# Patient Record
Sex: MALE | Race: BLACK OR AFRICAN AMERICAN | NOT HISPANIC OR LATINO | Employment: FULL TIME | URBAN - METROPOLITAN AREA
[De-identification: names, ages, dates, MRNs, and addresses within clinical notes are randomized per-mention and may not be internally consistent; named-entity substitution may affect disease eponyms.]

---

## 2023-12-29 ENCOUNTER — HOSPITAL ENCOUNTER (EMERGENCY)
Facility: HOSPITAL | Age: 43
Discharge: HOME | End: 2023-12-29
Attending: EMERGENCY MEDICINE
Payer: COMMERCIAL

## 2023-12-29 ENCOUNTER — APPOINTMENT (EMERGENCY)
Dept: CT IMAGING | Facility: HOSPITAL | Age: 43
End: 2023-12-29
Payer: COMMERCIAL

## 2023-12-29 VITALS
WEIGHT: 314.82 LBS | HEIGHT: 74 IN | DIASTOLIC BLOOD PRESSURE: 89 MMHG | OXYGEN SATURATION: 97 % | RESPIRATION RATE: 20 BRPM | HEART RATE: 104 BPM | TEMPERATURE: 98 F | BODY MASS INDEX: 40.4 KG/M2 | SYSTOLIC BLOOD PRESSURE: 163 MMHG

## 2023-12-29 DIAGNOSIS — N23 RENAL COLIC ON RIGHT SIDE: Primary | ICD-10-CM

## 2023-12-29 DIAGNOSIS — R11.2 NAUSEA AND VOMITING: ICD-10-CM

## 2023-12-29 LAB
ALBUMIN SERPL BCP-MCNC: 4.2 G/DL (ref 3.5–5)
ALP SERPL-CCNC: 57 U/L (ref 34–104)
ALT SERPL W P-5'-P-CCNC: 25 U/L (ref 7–52)
ANION GAP SERPL CALCULATED.3IONS-SCNC: 9 MMOL/L
AST SERPL W P-5'-P-CCNC: 19 U/L (ref 13–39)
BACTERIA UR QL AUTO: NORMAL /HPF
BASOPHILS # BLD AUTO: 0.05 THOUSANDS/ÂΜL (ref 0–0.1)
BASOPHILS NFR BLD AUTO: 1 % (ref 0–1)
BILIRUB SERPL-MCNC: 0.76 MG/DL (ref 0.2–1)
BILIRUB UR QL STRIP: NEGATIVE
BUN SERPL-MCNC: 15 MG/DL (ref 5–25)
CALCIUM SERPL-MCNC: 9 MG/DL (ref 8.4–10.2)
CHLORIDE SERPL-SCNC: 102 MMOL/L (ref 96–108)
CK SERPL-CCNC: 248 U/L (ref 39–308)
CLARITY UR: CLEAR
CO2 SERPL-SCNC: 25 MMOL/L (ref 21–32)
COLOR UR: COLORLESS
CREAT SERPL-MCNC: 0.96 MG/DL (ref 0.6–1.3)
EOSINOPHIL # BLD AUTO: 0.07 THOUSAND/ÂΜL (ref 0–0.61)
EOSINOPHIL NFR BLD AUTO: 1 % (ref 0–6)
ERYTHROCYTE [DISTWIDTH] IN BLOOD BY AUTOMATED COUNT: 12.9 % (ref 11.6–15.1)
GFR SERPL CREATININE-BSD FRML MDRD: 95 ML/MIN/1.73SQ M
GLUCOSE SERPL-MCNC: 359 MG/DL (ref 65–140)
GLUCOSE UR STRIP-MCNC: ABNORMAL MG/DL
HCT VFR BLD AUTO: 42.1 % (ref 36.5–49.3)
HGB BLD-MCNC: 14 G/DL (ref 12–17)
HGB UR QL STRIP.AUTO: ABNORMAL
IMM GRANULOCYTES # BLD AUTO: 0.04 THOUSAND/UL (ref 0–0.2)
IMM GRANULOCYTES NFR BLD AUTO: 0 % (ref 0–2)
KETONES UR STRIP-MCNC: NEGATIVE MG/DL
LEUKOCYTE ESTERASE UR QL STRIP: ABNORMAL
LIPASE SERPL-CCNC: 59 U/L (ref 11–82)
LYMPHOCYTES # BLD AUTO: 5 THOUSANDS/ÂΜL (ref 0.6–4.47)
LYMPHOCYTES NFR BLD AUTO: 51 % (ref 14–44)
MCH RBC QN AUTO: 23.6 PG (ref 26.8–34.3)
MCHC RBC AUTO-ENTMCNC: 33.3 G/DL (ref 31.4–37.4)
MCV RBC AUTO: 71 FL (ref 82–98)
MONOCYTES # BLD AUTO: 0.94 THOUSAND/ÂΜL (ref 0.17–1.22)
MONOCYTES NFR BLD AUTO: 10 % (ref 4–12)
NEUTROPHILS # BLD AUTO: 3.6 THOUSANDS/ÂΜL (ref 1.85–7.62)
NEUTS SEG NFR BLD AUTO: 37 % (ref 43–75)
NITRITE UR QL STRIP: NEGATIVE
NON-SQ EPI CELLS URNS QL MICRO: NORMAL /HPF
NRBC BLD AUTO-RTO: 0 /100 WBCS
PH UR STRIP.AUTO: 5.5 [PH]
PLATELET # BLD AUTO: 324 THOUSANDS/UL (ref 149–390)
PMV BLD AUTO: 10.6 FL (ref 8.9–12.7)
POTASSIUM SERPL-SCNC: 4.4 MMOL/L (ref 3.5–5.3)
PROT SERPL-MCNC: 7.3 G/DL (ref 6.4–8.4)
PROT UR STRIP-MCNC: ABNORMAL MG/DL
RBC # BLD AUTO: 5.92 MILLION/UL (ref 3.88–5.62)
RBC #/AREA URNS AUTO: NORMAL /HPF
SODIUM SERPL-SCNC: 136 MMOL/L (ref 135–147)
SP GR UR STRIP.AUTO: 1.03 (ref 1–1.03)
UROBILINOGEN UR STRIP-ACNC: <2 MG/DL
WBC # BLD AUTO: 9.7 THOUSAND/UL (ref 4.31–10.16)
WBC #/AREA URNS AUTO: NORMAL /HPF

## 2023-12-29 PROCEDURE — 96374 THER/PROPH/DIAG INJ IV PUSH: CPT

## 2023-12-29 PROCEDURE — 36415 COLL VENOUS BLD VENIPUNCTURE: CPT

## 2023-12-29 PROCEDURE — 96361 HYDRATE IV INFUSION ADD-ON: CPT

## 2023-12-29 PROCEDURE — 85025 COMPLETE CBC W/AUTO DIFF WBC: CPT | Performed by: EMERGENCY MEDICINE

## 2023-12-29 PROCEDURE — 80053 COMPREHEN METABOLIC PANEL: CPT | Performed by: EMERGENCY MEDICINE

## 2023-12-29 PROCEDURE — 99285 EMERGENCY DEPT VISIT HI MDM: CPT | Performed by: EMERGENCY MEDICINE

## 2023-12-29 PROCEDURE — 82550 ASSAY OF CK (CPK): CPT | Performed by: EMERGENCY MEDICINE

## 2023-12-29 PROCEDURE — 74176 CT ABD & PELVIS W/O CONTRAST: CPT

## 2023-12-29 PROCEDURE — 99284 EMERGENCY DEPT VISIT MOD MDM: CPT

## 2023-12-29 PROCEDURE — 83690 ASSAY OF LIPASE: CPT | Performed by: EMERGENCY MEDICINE

## 2023-12-29 PROCEDURE — G1004 CDSM NDSC: HCPCS

## 2023-12-29 PROCEDURE — 81001 URINALYSIS AUTO W/SCOPE: CPT | Performed by: EMERGENCY MEDICINE

## 2023-12-29 PROCEDURE — 96375 TX/PRO/DX INJ NEW DRUG ADDON: CPT

## 2023-12-29 RX ORDER — ONDANSETRON 4 MG/1
4 TABLET, ORALLY DISINTEGRATING ORAL EVERY 6 HOURS PRN
Qty: 20 TABLET | Refills: 0 | Status: SHIPPED | OUTPATIENT
Start: 2023-12-29

## 2023-12-29 RX ORDER — SODIUM CHLORIDE 9 MG/ML
150 INJECTION, SOLUTION INTRAVENOUS CONTINUOUS
Status: DISCONTINUED | OUTPATIENT
Start: 2023-12-29 | End: 2023-12-29 | Stop reason: HOSPADM

## 2023-12-29 RX ORDER — OXYCODONE HYDROCHLORIDE AND ACETAMINOPHEN 5; 325 MG/1; MG/1
1 TABLET ORAL ONCE
Status: COMPLETED | OUTPATIENT
Start: 2023-12-29 | End: 2023-12-29

## 2023-12-29 RX ORDER — ONDANSETRON 2 MG/ML
4 INJECTION INTRAMUSCULAR; INTRAVENOUS ONCE
Status: COMPLETED | OUTPATIENT
Start: 2023-12-29 | End: 2023-12-29

## 2023-12-29 RX ORDER — HYDROMORPHONE HCL/PF 1 MG/ML
1 SYRINGE (ML) INJECTION ONCE
Status: COMPLETED | OUTPATIENT
Start: 2023-12-29 | End: 2023-12-29

## 2023-12-29 RX ORDER — OXYCODONE HYDROCHLORIDE AND ACETAMINOPHEN 5; 325 MG/1; MG/1
1 TABLET ORAL EVERY 4 HOURS PRN
Qty: 20 TABLET | Refills: 0 | Status: SHIPPED | OUTPATIENT
Start: 2023-12-29 | End: 2024-01-02

## 2023-12-29 RX ORDER — KETOROLAC TROMETHAMINE 30 MG/ML
30 INJECTION, SOLUTION INTRAMUSCULAR; INTRAVENOUS ONCE
Status: COMPLETED | OUTPATIENT
Start: 2023-12-29 | End: 2023-12-29

## 2023-12-29 RX ADMIN — HYDROMORPHONE HYDROCHLORIDE 1 MG: 1 INJECTION, SOLUTION INTRAMUSCULAR; INTRAVENOUS; SUBCUTANEOUS at 03:42

## 2023-12-29 RX ADMIN — SODIUM CHLORIDE 1000 ML: 0.9 INJECTION, SOLUTION INTRAVENOUS at 02:58

## 2023-12-29 RX ADMIN — ONDANSETRON 4 MG: 2 INJECTION INTRAMUSCULAR; INTRAVENOUS at 02:58

## 2023-12-29 RX ADMIN — OXYCODONE HYDROCHLORIDE AND ACETAMINOPHEN 1 TABLET: 5; 325 TABLET ORAL at 06:31

## 2023-12-29 RX ADMIN — SODIUM CHLORIDE 150 ML/HR: 0.9 INJECTION, SOLUTION INTRAVENOUS at 03:44

## 2023-12-29 RX ADMIN — KETOROLAC TROMETHAMINE 30 MG: 30 INJECTION, SOLUTION INTRAMUSCULAR; INTRAVENOUS at 02:58

## 2023-12-29 NOTE — ED PROVIDER NOTES
History  Chief Complaint   Patient presents with    Flank Pain     Pt reports R flank pain that started tonight, +NV, very uncomfortable in triage, hx diabetes, no difficulty urinating      Patient is a 43 year old male with right flank pain without radiation with N/V tonight. No prior episodes. No fever. No diarrhea. No urinary sx.  Has diabetes. No recent old records from this ED seen on computer system.  [employer] website checked on this patient and no Rx found.       History provided by:  Patient and spouse   used: No    Flank Pain  Associated symptoms: nausea and vomiting    Associated symptoms: no diarrhea and no fever        None       Past Medical History:   Diagnosis Date    Diabetes mellitus (HCC)        History reviewed. No pertinent surgical history.    History reviewed. No pertinent family history.  I have reviewed and agree with the history as documented.    E-Cigarette/Vaping     E-Cigarette/Vaping Substances     Social History     Tobacco Use    Smoking status: Never    Smokeless tobacco: Never   Substance Use Topics    Alcohol use: Yes    Drug use: Never       Review of Systems   Constitutional:  Negative for fever.   Gastrointestinal:  Positive for nausea and vomiting. Negative for diarrhea.   Genitourinary:  Positive for flank pain. Negative for difficulty urinating.   All other systems reviewed and are negative.      Physical Exam  Physical Exam  Vitals and nursing note reviewed.   Constitutional:       General: He is in acute distress (moderate).      Appearance: He is diaphoretic.   HENT:      Head: Normocephalic and atraumatic.      Mouth/Throat:      Mouth: Mucous membranes are moist.   Eyes:      General: No scleral icterus.  Cardiovascular:      Rate and Rhythm: Regular rhythm. Tachycardia present.      Heart sounds: Normal heart sounds. No murmur heard.  Pulmonary:      Effort: Pulmonary effort is normal. No respiratory distress.      Breath sounds: Normal breath  sounds.   Abdominal:      General: Bowel sounds are normal. There is no distension.      Palpations: Abdomen is soft.      Tenderness: There is no abdominal tenderness. There is right CVA tenderness. There is no guarding.   Musculoskeletal:         General: No deformity.      Cervical back: Normal range of motion and neck supple.      Right lower leg: No edema.      Left lower leg: No edema.   Skin:     General: Skin is warm.      Findings: No erythema or rash.   Neurological:      General: No focal deficit present.      Mental Status: He is alert and oriented to person, place, and time.   Psychiatric:         Mood and Affect: Mood normal.         Vital Signs  ED Triage Vitals [12/29/23 0221]   Temperature Pulse Respirations Blood Pressure SpO2   98 °F (36.7 °C) 100 20 163/89 97 %      Temp Source Heart Rate Source Patient Position - Orthostatic VS BP Location FiO2 (%)   Oral Monitor Sitting Left arm --      Pain Score       10 - Worst Possible Pain           Vitals:    12/29/23 0221 12/29/23 0400 12/29/23 0430 12/29/23 0615   BP: 163/89      Pulse: 100 103 104 104   Patient Position - Orthostatic VS: Sitting            Visual Acuity      ED Medications  Medications   sodium chloride 0.9 % infusion (0 mL/hr Intravenous Stopped 12/29/23 0629)   oxyCODONE-acetaminophen (PERCOCET) 5-325 mg per tablet 1 tablet (has no administration in time range)   sodium chloride 0.9 % bolus 1,000 mL (0 mL Intravenous Stopped 12/29/23 0358)   ondansetron (ZOFRAN) injection 4 mg (4 mg Intravenous Given 12/29/23 0258)   ketorolac (TORADOL) injection 30 mg (30 mg Intravenous Given 12/29/23 0258)   HYDROmorphone (DILAUDID) injection 1 mg (1 mg Intravenous Given 12/29/23 0342)       Diagnostic Studies  Results Reviewed       Procedure Component Value Units Date/Time    Urine Microscopic [[id number]]  (Normal) Collected: 12/29/23 0429    Lab Status: Final result Specimen: Urine, Clean Catch Updated: 12/29/23 0518     RBC, UA 1-2 /hpf       WBC, UA None Seen /hpf      Epithelial Cells None Seen /hpf      Bacteria, UA None Seen /hpf     UA w Reflex to Microscopic w Reflex to Culture [[id number]]  (Abnormal) Collected: 12/29/23 0429    Lab Status: Final result Specimen: Urine, Clean Catch Updated: 12/29/23 0516     Color, UA Colorless     Clarity, UA Clear     Specific Gravity, UA 1.033     pH, UA 5.5     Leukocytes, UA Elevated glucose may cause decreased leukocyte values. See urine microscopic for UWBC result     Nitrite, UA Negative     Protein, UA 30 (1+) mg/dl      Glucose, UA >=1000 (1%) mg/dl      Ketones, UA Negative mg/dl      Urobilinogen, UA <2.0 mg/dl      Bilirubin, UA Negative     Occult Blood, UA Trace    CK [[id number]]  (Normal) Collected: 12/29/23 0229    Lab Status: Final result Specimen: Blood from Arm, Left Updated: 12/29/23 0307     Total  U/L     Comprehensive metabolic panel [[id number]]  (Abnormal) Collected: 12/29/23 0229    Lab Status: Final result Specimen: Blood from Arm, Left Updated: 12/29/23 0254     Sodium 136 mmol/L      Potassium 4.4 mmol/L      Chloride 102 mmol/L      CO2 25 mmol/L      ANION GAP 9 mmol/L      BUN 15 mg/dL      Creatinine 0.96 mg/dL      Glucose 359 mg/dL      Calcium 9.0 mg/dL      AST 19 U/L      ALT 25 U/L      Alkaline Phosphatase 57 U/L      Total Protein 7.3 g/dL      Albumin 4.2 g/dL      Total Bilirubin 0.76 mg/dL      eGFR 95 ml/min/1.73sq m     Narrative:      National Kidney Disease Foundation guidelines for Chronic Kidney Disease (CKD):     Stage 1 with normal or high GFR (GFR > 90 mL/min/1.73 square meters)    Stage 2 Mild CKD (GFR = 60-89 mL/min/1.73 square meters)    Stage 3A Moderate CKD (GFR = 45-59 mL/min/1.73 square meters)    Stage 3B Moderate CKD (GFR = 30-44 mL/min/1.73 square meters)    Stage 4 Severe CKD (GFR = 15-29 mL/min/1.73 square meters)    Stage 5 End Stage CKD (GFR <15 mL/min/1.73 square meters)  Note: GFR calculation is accurate only with a steady state creatinine     Lipase [[id number]]  (Normal) Collected: 12/29/23 0229    Lab Status: Final result Specimen: Blood from Arm, Left Updated: 12/29/23 0254     Lipase 59 u/L     CBC and differential [[id number]]  (Abnormal) Collected: 12/29/23 0229    Lab Status: Final result Specimen: Blood from Arm, Left Updated: 12/29/23 0242     WBC 9.70 Thousand/uL      RBC 5.92 Million/uL      Hemoglobin 14.0 g/dL      Hematocrit 42.1 %      MCV 71 fL      MCH 23.6 pg      MCHC 33.3 g/dL      RDW 12.9 %      MPV 10.6 fL      Platelets 324 Thousands/uL      nRBC 0 /100 WBCs      Neutrophils Relative 37 %      Immat GRANS % 0 %      Lymphocytes Relative 51 %      Monocytes Relative 10 %      Eosinophils Relative 1 %      Basophils Relative 1 %      Neutrophils Absolute 3.60 Thousands/µL      Immature Grans Absolute 0.04 Thousand/uL      Lymphocytes Absolute 5.00 Thousands/µL      Monocytes Absolute 0.94 Thousand/µL      Eosinophils Absolute 0.07 Thousand/µL      Basophils Absolute 0.05 Thousands/µL                    CT renal stone study abdomen pelvis without contrast   ED Interpretation by Óscar Cervantes MD (12/29 0401)   URINARY TRACT FINDINGS:     RIGHT KIDNEY AND URETER: There is a tiny punctate nonobstructing calculus in the lower pole right kidney. The right kidney is mildly swollen and there is mild stranding adjacent to the right kidney and tracking caudally. There is mild right   hydronephrosis and right hydroureter.     LEFT KIDNEY AND URETER:  No urinary tract calculi.  No hydronephrosis or hydroureter.     URINARY BLADDER: There is a 1 to 2 mm calculus within the posterior aspect of the urinary bladder        ADDITIONAL FINDINGS:     LOWER CHEST:  No clinically significant abnormality identified in the visualized lower chest.     SOLID VISCERA: Limited low radiation dose noncontrast CT evaluation demonstrates no clinically significant abnormality of the imaged portions of the spleen, pancreas, or adrenal glands. Mild hepatic  steatosis     GALLBLADDER/BILIARY TREE:  No calcified gallstones. No pericholecystic inflammatory change.  No biliary dilatation.     STOMACH AND BOWEL:  Unremarkable.        APPENDIX: A normal appendix was visualized.     ABDOMINOPELVIC CAVITY:  No ascites.  No pneumoperitoneum.  No lymphadenopathy.     VESSELS: Minimal atherosclerosis. No abdominal aortic aneurysm.     REPRODUCTIVE ORGANS:  Unremarkable for patient's age.     ABDOMINAL WALL/INGUINAL REGIONS:  Unremarkable.     OSSEOUS STRUCTURES:  No acute fracture or destructive osseous lesion.     IMPRESSION:     There is mild right hydronephrosis and right hydroureter and there is a 1 to 2 mm calculus within the urinary bladder. This suggests recent passage of a calculus. Please see discussion.     Mild hepatic steatosis.     Workstation performed: [id number]           Final Result by Wendy Murray MD (12/29 0357)      There is mild right hydronephrosis and right hydroureter and there is a 1 to 2 mm calculus within the urinary bladder. This suggests recent passage of a calculus. Please see discussion.      Mild hepatic steatosis.      Workstation performed: [id number]                    Procedures  Procedures         ED Course  ED Course as of 12/29/23 0630   Fri Dec 29, 2023   0323 Patient still with pain so IV dilaudid ordered.    0408 Labs and CT d/w patient and wife with patient's permission. Pain improved.    0411 Glucose, Random(!): 359  IVFs being given   0618 UA d/w patient and wife.    0629 Percocet po ordered for residual discomfort prior to discharge. Was instructed to strain all urine and urine strainers given by me.                                SBIRT 22yo+      Flowsheet Row Most Recent Value   Initial Alcohol Screen: US AUDIT-C     1. How often do you have a drink containing alcohol? 1 Filed at: 12/29/2023 0314   2. How many drinks containing alcohol do you have on a typical day you are drinking?  0 Filed at: 12/29/2023 0314   3a. Male  UNDER 65: How often do you have five or more drinks on one occasion? 0 Filed at: 12/29/2023 0314   3b. FEMALE Any Age, or MALE 65+: How often do you have 4 or more drinks on one occassion? 0 Filed at: 12/29/2023 0314   Audit-C Score 1 Filed at: 12/29/2023 0314   MAGGI: How many times in the past year have you...    Used an illegal drug or used a prescription medication for non-medical reasons? Never Filed at: 12/29/2023 0314                      Medical Decision Making  DDx including but not limited to: renal colic, pyelonephritis, UTI, GI etiology, appendicitis, diverticulitis, pancreatitis, cholecystitis, biliary colic, doubt AAA; rhabdomyolysis, tumor, zoster, metabolic abnormality.       Amount and/or Complexity of Data Reviewed  Labs: ordered. Decision-making details documented in ED Course.  Radiology: ordered. Decision-making details documented in ED Course.    Risk  Prescription drug management.  Decision regarding hospitalization.             Disposition  Final diagnoses:   Renal colic on right side   Nausea and vomiting     Time reflects when diagnosis was documented in both MDM as applicable and the Disposition within this note       Time User Action Codes Description Comment    12/29/2023  6:22 AM Óscar Cervantes Add [N23] Renal colic on right side     12/29/2023  6:22 AM Óscar Cervantes Add [R11.2] Nausea and vomiting           ED Disposition       ED Disposition   Discharge    Condition   Stable    Date/Time   Fri Dec 29, 2023 0622    Comment   Ashwin Fraser discharge to home/self care.                   Follow-up Information       Follow up With Specialties Details Why Contact Info Additional Information    [facility] Call today Return sooner if increased pain, fever, vomiting, difficulty urinating. No driving with percocet. Do not use acetaminophen with percocet. [address] [facility]  [address]  [facility] [address] [phone] [facility]  [facility] [place], [address] [place] [address] [address], [place], [place], [address] [phone]            Patient's Medications   Discharge Prescriptions    ONDANSETRON (ZOFRAN-ODT) 4 MG DISINTEGRATING TABLET    Take 1 tablet (4 mg total) by mouth every 6 (six) hours as needed for nausea or vomiting       Start Date: 12/29/2023End Date: --       Order Dose: 4 mg       Quantity: 20 tablet    Refills: 0    OXYCODONE-ACETAMINOPHEN (PERCOCET) 5-325 MG PER TABLET    Take 1 tablet by mouth every 4 (four) hours as needed for moderate pain for up to 4 days Max Daily Amount: 6 tablets       Start Date: 12/29/2023End Date: 1/2/2024       Order Dose: 1 tablet       Quantity: 20 tablet    Refills: 0           PDMP Review         Value Time User    PDMP Reviewed  Yes 12/29/2023  2:07 AM Óscar Cervantes MD            ED Provider  Electronically Signed by             Óscar Cervantes MD  12/29/23 0625       Óscar Cervantes MD  12/29/23 0630

## 2024-01-23 ENCOUNTER — TELEPHONE (OUTPATIENT)
Age: 44
End: 2024-01-23

## 2024-02-07 ENCOUNTER — APPOINTMENT (OUTPATIENT)
Dept: URGENT CARE | Age: 44
End: 2024-02-07

## 2024-02-09 ENCOUNTER — APPOINTMENT (OUTPATIENT)
Dept: URGENT CARE | Age: 44
End: 2024-02-09

## 2024-02-16 ENCOUNTER — CONSULT (OUTPATIENT)
Dept: UROLOGY | Facility: CLINIC | Age: 44
End: 2024-02-16
Payer: COMMERCIAL

## 2024-02-16 VITALS
WEIGHT: 313 LBS | DIASTOLIC BLOOD PRESSURE: 80 MMHG | BODY MASS INDEX: 40.17 KG/M2 | HEART RATE: 82 BPM | HEIGHT: 74 IN | SYSTOLIC BLOOD PRESSURE: 140 MMHG | OXYGEN SATURATION: 97 %

## 2024-02-16 DIAGNOSIS — N23 RENAL COLIC ON RIGHT SIDE: Primary | ICD-10-CM

## 2024-02-16 LAB
SL AMB  POCT GLUCOSE, UA: NORMAL
SL AMB LEUKOCYTE ESTERASE,UA: NORMAL
SL AMB POCT BILIRUBIN,UA: NORMAL
SL AMB POCT BLOOD,UA: NORMAL
SL AMB POCT CLARITY,UA: CLEAR
SL AMB POCT COLOR,UA: YELLOW
SL AMB POCT KETONES,UA: NORMAL
SL AMB POCT NITRITE,UA: NORMAL
SL AMB POCT PH,UA: 6
SL AMB POCT SPECIFIC GRAVITY,UA: 1030
SL AMB POCT URINE PROTEIN: NORMAL
SL AMB POCT UROBILINOGEN: 0.2

## 2024-02-16 PROCEDURE — 81002 URINALYSIS NONAUTO W/O SCOPE: CPT | Performed by: UROLOGY

## 2024-02-16 PROCEDURE — 99203 OFFICE O/P NEW LOW 30 MIN: CPT | Performed by: UROLOGY

## 2024-02-16 NOTE — PROGRESS NOTES
"Ashwin Fraser is a(n) 43 y.o. male. , :  1980    Subjective   Chief Complaint:   Chief Complaint   Patient presents with    Nephrolithiasis     NEW PT     Diagnoses: The encounter diagnosis was Renal colic on right side.    Assessment/Plan  44 y/o DM, kidney stones and right hydro with some pain. Prefers CT to cystoscopy with retrograde. Telehealth about CT results    Patient Instructions   Get CT and we have telehealth visit.     Radiology  2023 CT abdomen pelvis  There is mild right hydronephrosis and right hydroureter and there is a 1 to 2 mm calculus within the urinary bladder. This suggests recent passage of a calculus. Please see discussion. There is a tiny punctate nonobstructing calculus in the lower pole right kidney. The right kidney is mildly swollen and there is mild stranding adjacent to the right kidney and tracking caudally. There is mild right   hydronephrosis and right hydroureter.     Mild hepatic steatosis.     History  Kidney stones and bladder stone      Prior Visits  2023 emergency visit for right flank pain with nausea and vomiting.  Presumed passed right distal ureteral stone into the bladder.  Hydronephrosis noted on CT imaging.    2024 [facility]  Right flank pain in ER 23.  Still not feeling perfect. Prefers to get CT scan.  Last pain was really mid January, now sort of nagging when wakes up. No fevers or chills or vomit.  No stones passed.     No results found for: \"PSA\"  No components found for: \"CR\"    No Known Allergies    Review of Systems    History reviewed. No pertinent surgical history.    History reviewed. No pertinent family history.    Social History     Socioeconomic History    Marital status: /Civil Union     Spouse name: Not on file    Number of children: Not on file    Years of education: Not on file    Highest education level: Not on file   Occupational History    Not on file   Tobacco Use    Smoking status: Never    Smokeless " "tobacco: Never   Substance and Sexual Activity    Alcohol use: Yes    Drug use: Never    Sexual activity: Not on file   Other Topics Concern    Not on file   Social History Narrative    Not on file     Social Determinants of Health     Financial Resource Strain: Not on file   Food Insecurity: Not on file   Transportation Needs: Not on file   Physical Activity: Not on file   Stress: Not on file   Social Connections: Not on file   Intimate Partner Violence: Not on file   Housing Stability: Not on file         Current Outpatient Medications:     ondansetron (ZOFRAN-ODT) 4 mg disintegrating tablet, Take 1 tablet (4 mg total) by mouth every 6 (six) hours as needed for nausea or vomiting, Disp: 20 tablet, Rfl: 0    Objective     /80 (BP Location: Left arm, Patient Position: Sitting, Cuff Size: Standard)   Pulse 82   Ht 6' 2\" (1.88 m)   Wt (!) 142 kg (313 lb)   SpO2 97%   BMI 40.19 kg/m²     Physical Exam      Ian Gladys, [facility]  "

## 2024-02-23 ENCOUNTER — HOSPITAL ENCOUNTER (OUTPATIENT)
Dept: RADIOLOGY | Age: 44
Discharge: HOME | End: 2024-02-23
Payer: COMMERCIAL

## 2024-02-23 DIAGNOSIS — N23 RENAL COLIC ON RIGHT SIDE: ICD-10-CM

## 2024-02-23 PROCEDURE — G1004 CDSM NDSC: HCPCS

## 2024-02-23 PROCEDURE — 74176 CT ABD & PELVIS W/O CONTRAST: CPT

## 2024-03-15 ENCOUNTER — TELEMEDICINE (OUTPATIENT)
Dept: UROLOGY | Facility: CLINIC | Age: 44
End: 2024-03-15
Payer: COMMERCIAL

## 2024-03-15 DIAGNOSIS — N23 RENAL COLIC ON RIGHT SIDE: Primary | ICD-10-CM

## 2024-03-15 PROCEDURE — 99213 OFFICE O/P EST LOW 20 MIN: CPT | Performed by: UROLOGY

## 2024-03-15 NOTE — PROGRESS NOTES
Ashwin Fraser is a(n) 44 y.o. male. , :  1980    Subjective   Chief Complaint: No chief complaint on file.    Diagnoses: There were no encounter diagnoses.    Assessment/Plan  No more discomfort.  CT confirms that the bladder stone vs ureteral stone.  Agrees to low oxalate diet.  Agrees to adjust diet and fluids.  Will get x ray in 6 months.  Call to get appt after KUB. He did not connect in EPIC so I called him on Teams and we spoke without video.  He was at work and I was at the office. Total visit time reviewing case and speaking and charting 20 minutes.    Patient Instructions   Avoid oxalate. Drink lots of water and especially with lemon.  Get xray to make sure no new stones in 6-12 months.     Radiology  2023 CT abdomen pelvis  There is mild right hydronephrosis and right hydroureter and there is a 1 to 2 mm calculus within the urinary bladder. This suggests recent passage of a calculus. Please see discussion. There is a tiny punctate nonobstructing calculus in the lower pole right kidney. The right kidney is mildly swollen and there is mild stranding adjacent to the right kidney and tracking caudally. There is mild right hydronephrosis and right hydroureter.     Mild hepatic steatosis.    3/2/2024 CT abdomen pelvis  Impression: Prior tiny bladder calculus has passed. Previous minimal right hydronephrosis has resolved. Persistence of tiny right lower pole renal nonobstructive calculi.      History  Kidney stones and bladder stone     Prior Visits  2023 emergency visit for right flank pain with nausea and vomiting.  Presumed passed right distal ureteral stone into the bladder.  Hydronephrosis noted on CT imaging.     2024 [facility]  Right flank pain in ER 23.  Still not feeling perfect. Prefers to get CT scan.  Last pain was really mid January, now sort of nagging when wakes up. No fevers or chills or vomit.  No stones passed.     3/15/2024 telehealth visit [facility]  No  "more discomfort.  CT confirms that the bladder stone vs ureteral stone.  Agrees to low oxalate diet.  Agrees to adjust diet and fluids.  Will get x ray in 6 months.  Call to get appt after KUB. He did not connect in EPIC so I called him on Teams and we spoke without video.  He was at work and I was at the office. Total visit time reviewing case and speaking and charting 20 minutes.    No results found for: \"PSA\"  No results found for: \"TESTOSTERONE\"  No components found for: \"CR\"    No Known Allergies    Review of Systems    No past surgical history on file.    No family history on file.    Social History     Socioeconomic History    Marital status: /Civil Union     Spouse name: Not on file    Number of children: Not on file    Years of education: Not on file    Highest education level: Not on file   Occupational History    Not on file   Tobacco Use    Smoking status: Never    Smokeless tobacco: Never   Substance and Sexual Activity    Alcohol use: Yes    Drug use: Never    Sexual activity: Not on file   Other Topics Concern    Not on file   Social History Narrative    Not on file     Social Determinants of Health     Financial Resource Strain: Not on file   Food Insecurity: Not on file   Transportation Needs: Not on file   Physical Activity: Not on file   Stress: Not on file   Social Connections: Not on file   Intimate Partner Violence: Not on file   Housing Stability: Not on file         Current Outpatient Medications:     ondansetron (ZOFRAN-ODT) 4 mg disintegrating tablet, Take 1 tablet (4 mg total) by mouth every 6 (six) hours as needed for nausea or vomiting, Disp: 20 tablet, Rfl: 0    Objective     There were no vitals taken for this visit.    Physical Exam      Ian Gladys, [facility] [place]  "

## 2024-03-15 NOTE — PATIENT INSTRUCTIONS
Avoid oxalate. Drink lots of water and especially with lemon.  Get xray to make sure no new stones in 6-12 months.

## 2025-02-14 ENCOUNTER — APPOINTMENT (OUTPATIENT)
Dept: URGENT CARE | Age: 45
End: 2025-02-14